# Patient Record
Sex: MALE | Race: WHITE | NOT HISPANIC OR LATINO | Employment: OTHER | URBAN - METROPOLITAN AREA
[De-identification: names, ages, dates, MRNs, and addresses within clinical notes are randomized per-mention and may not be internally consistent; named-entity substitution may affect disease eponyms.]

---

## 2023-12-27 ENCOUNTER — OFFICE VISIT (OUTPATIENT)
Dept: OTOLARYNGOLOGY | Facility: CLINIC | Age: 88
End: 2023-12-27
Payer: MEDICARE

## 2023-12-27 DIAGNOSIS — H61.23 BILATERAL IMPACTED CERUMEN: Primary | ICD-10-CM

## 2023-12-27 PROCEDURE — 69210 REMOVE IMPACTED EAR WAX UNI: CPT | Performed by: OTOLARYNGOLOGY

## 2023-12-27 NOTE — PROGRESS NOTES
Alex Miller is a 88 y.o. year old male patient with Cerumen Impaction     Patient returns to the office today for follow-up on ears.  Patient has a history of cerumen impaction is here for routine removal.  All other ENT related concerns are negative.      Physical exam:  General appearance: No acute distress. Normal facies. Symmetric facial movement. No gross lesions of the face are noted.  The external ear structures appear normal.  Patient with bilateral cerumen impaction removed today with curette under the otomicroscope.  The ear canals patent and the tympanic membranes are intact without evidence of air-fluid levels, retraction, or congenital defects.  Anterior rhinoscopy notes essentially a midline nasal septum. Examination is noted for normal healthy mucosal membranes without any evidence of lesions, polyps, or exudate. The tongue is normally mobile. There are no lesions on the gingiva, buccal, or oral mucosa. There are no oral cavity masses.  The neck is negative for mass lymphadenopathy. The trachea and parotid are clear. The thyroid bed is grossly unremarkable. The salivary gland structures are grossly unremarkable.    Procedure: Patient with bilateral cerumen removed today with curette under the otomicroscope.    Assessment/Plan   1.  Cerumen impaction  Patient with cerumen impaction noted bilaterally.  At this time cerumen was removed and recommendation for the patient is observation and follow-up in 6 months.  All questions were answered in this regard accordingly.

## 2024-06-21 ENCOUNTER — APPOINTMENT (OUTPATIENT)
Dept: OTOLARYNGOLOGY | Facility: CLINIC | Age: 89
End: 2024-06-21
Payer: MEDICARE

## 2024-06-21 DIAGNOSIS — H61.23 BILATERAL IMPACTED CERUMEN: Primary | ICD-10-CM

## 2024-06-21 PROCEDURE — 69210 REMOVE IMPACTED EAR WAX UNI: CPT | Performed by: PHYSICIAN ASSISTANT

## 2024-06-21 PROCEDURE — 99212 OFFICE O/P EST SF 10 MIN: CPT | Performed by: OTOLARYNGOLOGY

## 2024-06-21 NOTE — PROGRESS NOTES
Alex Miller is a 88 y.o. year old male patient with history of cerumen impactions.  Patient here today removal.  The patient is otherwise well and denies other ENT related concerns at this time.             Physical Exam:  General appearance: No acute distress. Normal facies. Symmetric facial movement. No gross lesions of the face are noted.  The external ear structures appear normal.  Bilateral cerumen was removed under the microscope with used to curette, alligator forceps, and hydrogen peroxide with suction as necessary. Following the removal, the ear structures appear to be otherwise grossly normal.  Anterior rhinoscopy notes essentially a midline nasal septum. Examination is noted for normal healthy mucosal membranes without any evidence of lesions, polyps, or exudate. The tongue is normally mobile. There are no lesions on the gingiva, buccal, or oral mucosa. There are no oral cavity masses.  The neck is negative for mass lymphadenopathy. The trachea and parotid are clear. The thyroid bed is grossly unremarkable. The salivary gland structures are grossly unremarkable.    Procedure:  Bilateral cerumen was removed under the microscope with used to curette, alligator forceps, and hydrogen peroxide with suction as necessary.     Assessment/Plan   1.  Cerumen impaction  Patient with bilateral cerumen impaction and status post removal.  Recommendation at this time is observation and see us back in 6 months.

## 2024-12-13 ENCOUNTER — APPOINTMENT (OUTPATIENT)
Dept: OTOLARYNGOLOGY | Facility: CLINIC | Age: 89
End: 2024-12-13
Payer: MEDICARE

## 2024-12-13 DIAGNOSIS — H60.41 CHOLESTEATOMA OF EXTERNAL AUDITORY CANAL, RIGHT: ICD-10-CM

## 2024-12-13 DIAGNOSIS — H61.21 IMPACTED CERUMEN, RIGHT EAR: Primary | ICD-10-CM

## 2024-12-13 PROCEDURE — 99213 OFFICE O/P EST LOW 20 MIN: CPT | Performed by: OTOLARYNGOLOGY

## 2024-12-13 PROCEDURE — 1160F RVW MEDS BY RX/DR IN RCRD: CPT | Performed by: OTOLARYNGOLOGY

## 2024-12-13 PROCEDURE — 1159F MED LIST DOCD IN RCRD: CPT | Performed by: OTOLARYNGOLOGY

## 2024-12-13 NOTE — PROGRESS NOTES
The patient returns.  We are seeing him back today for check on cerumen impaction management.  He denies any other worrisome issues.  The remainder the head neck inquiry is clear.  There have been no significant changes in past medical or past surgical histories except as mentioned.    Exam:  No acute distress.  Examination of the left ear is normal. The middle ear, tympanic membrane, external auditory canal, and external ear itself are grossly unremarkable without evidence of active disease.  Cerumen impactions removed from the right ear.  The looks like there may be early start of canal cholesteatoma based some erosive component in that canal.  It is not bad and is very early in his nature.  Nasal exam is clear anteriorly. The septum is relatively straight and the nasal mucosa is grossly unremarkable without evidence of any worrisome infection or polyp or mass. The oral cavity and oropharynx are unremarkable. There is no evidence of any gross lesion or mucosal irregularity throughout the structures. The neck is negative for mass or lymphadenopathy. The trachea and parotid region are clear free of obvious mass or tumor. Facial structures are grossly otherwise unremarkable as well.    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  We will sit tight and observe him.  I will see him back for recheck in 6 months sooner with issue.  We will try to keep him out of the operating room as best we can.  All questions were answered in this regard accordingly.

## 2025-06-18 ENCOUNTER — APPOINTMENT (OUTPATIENT)
Dept: OTOLARYNGOLOGY | Facility: CLINIC | Age: OVER 89
End: 2025-06-18
Payer: MEDICARE

## 2025-06-18 DIAGNOSIS — H60.41 CHOLESTEATOMA OF EXTERNAL AUDITORY CANAL, RIGHT: Primary | ICD-10-CM

## 2025-06-18 DIAGNOSIS — H61.21 IMPACTED CERUMEN, RIGHT EAR: ICD-10-CM

## 2025-06-18 PROCEDURE — 99212 OFFICE O/P EST SF 10 MIN: CPT | Performed by: OTOLARYNGOLOGY

## 2025-06-18 PROCEDURE — 1160F RVW MEDS BY RX/DR IN RCRD: CPT | Performed by: OTOLARYNGOLOGY

## 2025-06-18 PROCEDURE — 1159F MED LIST DOCD IN RCRD: CPT | Performed by: OTOLARYNGOLOGY

## 2025-06-18 NOTE — PROGRESS NOTES
Subjective   Patient ID: Alex Miller is a 89 y.o. male who presents for Cerumen Impaction.    HPI  The patient returns, being seen for evaluation of ears. History of cerumen impaction, here today for removal of same. At last visit early start of right canal cholesteatoma was noted. He denies otalgia, otorrhea, tinnitus, decreased hearing, dizziness, vertigo. All remaining head neck inquiry otherwise negative.     Physical Exam & Procedure:  Right ear impacted cerumen removed under otomicroscopic examination using suction, curette, and forceps. Patient tolerated procedure without difficulty. Following removal, TMs are intact with no sign of infection, effusion, retraction, or perforation.   Nasal exam is clear anteriorly. The septum is relatively straight and the nasal mucosa is grossly unremarkable without evidence of any worrisome infection or polyp or mass. The oral cavity and oropharynx are unremarkable. There is no evidence of any gross lesion or mucosal irregularity throughout the structures. The neck is negative for mass or lymphadenopathy. The trachea and parotid region are clear free of obvious mass or tumor. Facial structures are grossly otherwise unremarkable as well.    Assessment/Plan   Patient presents for evaluation of cerumen impaction. The ears were cleaned using otomicroscope and instrumentation.  Evidence of right early canal cholesteatoma. Patient tolerated the procedure well. All questions were answered to patient's satisfaction. Patient to follow-up in 6 months, sooner with issue.

## 2025-12-10 ENCOUNTER — APPOINTMENT (OUTPATIENT)
Dept: OTOLARYNGOLOGY | Facility: CLINIC | Age: OVER 89
End: 2025-12-10
Payer: MEDICARE